# Patient Record
Sex: FEMALE | Race: WHITE
[De-identification: names, ages, dates, MRNs, and addresses within clinical notes are randomized per-mention and may not be internally consistent; named-entity substitution may affect disease eponyms.]

---

## 2019-07-03 ENCOUNTER — HOSPITAL ENCOUNTER (INPATIENT)
Dept: HOSPITAL 91 - E/R | Age: 29
LOS: 2 days | Discharge: HOME | DRG: 819 | End: 2019-07-05
Payer: COMMERCIAL

## 2019-07-03 ENCOUNTER — HOSPITAL ENCOUNTER (INPATIENT)
Dept: HOSPITAL 10 - E/R | Age: 29
LOS: 2 days | Discharge: HOME | DRG: 819 | End: 2019-07-05
Attending: OBSTETRICS & GYNECOLOGY | Admitting: OBSTETRICS & GYNECOLOGY
Payer: COMMERCIAL

## 2019-07-03 VITALS
BODY MASS INDEX: 28.24 KG/M2 | BODY MASS INDEX: 28.24 KG/M2 | HEIGHT: 67 IN | WEIGHT: 179.9 LBS | HEIGHT: 67 IN | WEIGHT: 179.9 LBS

## 2019-07-03 VITALS — DIASTOLIC BLOOD PRESSURE: 56 MMHG | RESPIRATION RATE: 17 BRPM | HEART RATE: 60 BPM | SYSTOLIC BLOOD PRESSURE: 105 MMHG

## 2019-07-03 DIAGNOSIS — N73.6: ICD-10-CM

## 2019-07-03 DIAGNOSIS — O00.101: Primary | ICD-10-CM

## 2019-07-03 LAB
ADD MAN DIFF?: NO
ANION GAP: 11 (ref 5–13)
BASOPHIL #: 0.1 10^3/UL (ref 0–0.1)
BASOPHILS %: 0.8 % (ref 0–2)
BLOOD UREA NITROGEN: 11 MG/DL (ref 7–20)
CALCIUM: 9.4 MG/DL (ref 8.4–10.2)
CARBON DIOXIDE: 23 MMOL/L (ref 21–31)
CHLORIDE: 107 MMOL/L (ref 97–110)
CREATININE: 0.66 MG/DL (ref 0.44–1)
EOSINOPHILS #: 0.6 10^3/UL (ref 0–0.5)
EOSINOPHILS %: 5.4 % (ref 0–7)
GLUCOSE: 86 MG/DL (ref 70–220)
HEMATOCRIT: 39.4 % (ref 37–47)
HEMOGLOBIN: 14.2 G/DL (ref 12–16)
IMMATURE GRANS #M: 0.05 10^3/UL (ref 0–0.03)
IMMATURE GRANS % (M): 0.4 % (ref 0–0.43)
LYMPHOCYTES #: 3.6 10^3/UL (ref 0.8–2.9)
LYMPHOCYTES %: 30.9 % (ref 15–51)
MEAN CORPUSCULAR HEMOGLOBIN: 30.1 PG (ref 29–33)
MEAN CORPUSCULAR HGB CONC: 36 G/DL (ref 32–37)
MEAN CORPUSCULAR VOLUME: 83.5 FL (ref 82–101)
MEAN PLATELET VOLUME: 10.6 FL (ref 7.4–10.4)
MONOCYTE #: 0.9 10^3/UL (ref 0.3–0.9)
MONOCYTES %: 7.6 % (ref 0–11)
NEUTROPHIL #: 6.5 10^3/UL (ref 1.6–7.5)
NEUTROPHILS %: 54.9 % (ref 39–77)
NUCLEATED RED BLOOD CELLS #: 0 10^3/UL (ref 0–0)
NUCLEATED RED BLOOD CELLS%: 0 /100WBC (ref 0–0)
PLATELET COUNT: 337 10^3/UL (ref 140–415)
POTASSIUM: 4 MMOL/L (ref 3.5–5.1)
RED BLOOD COUNT: 4.72 10^6/UL (ref 4.2–5.4)
RED CELL DISTRIBUTION WIDTH: 12.3 % (ref 11.5–14.5)
SODIUM: 141 MMOL/L (ref 135–144)
WHITE BLOOD COUNT: 11.8 10^3/UL (ref 4.8–10.8)

## 2019-07-03 PROCEDURE — 86901 BLOOD TYPING SEROLOGIC RH(D): CPT

## 2019-07-03 PROCEDURE — 76817 TRANSVAGINAL US OBSTETRIC: CPT

## 2019-07-03 PROCEDURE — 84703 CHORIONIC GONADOTROPIN ASSAY: CPT

## 2019-07-03 PROCEDURE — 36415 COLL VENOUS BLD VENIPUNCTURE: CPT

## 2019-07-03 PROCEDURE — 86900 BLOOD TYPING SEROLOGIC ABO: CPT

## 2019-07-03 PROCEDURE — 88305 TISSUE EXAM BY PATHOLOGIST: CPT

## 2019-07-03 PROCEDURE — 86850 RBC ANTIBODY SCREEN: CPT

## 2019-07-03 PROCEDURE — 85025 COMPLETE CBC W/AUTO DIFF WBC: CPT

## 2019-07-03 PROCEDURE — 99285 EMERGENCY DEPT VISIT HI MDM: CPT

## 2019-07-03 PROCEDURE — 80048 BASIC METABOLIC PNL TOTAL CA: CPT

## 2019-07-03 PROCEDURE — 84702 CHORIONIC GONADOTROPIN TEST: CPT

## 2019-07-03 PROCEDURE — 76801 OB US < 14 WKS SINGLE FETUS: CPT

## 2019-07-03 RX ADMIN — OXYTOCIN 1 MLS/HR: 10 INJECTION, SOLUTION INTRAMUSCULAR; INTRAVENOUS at 23:30

## 2019-07-03 NOTE — ERD
ER Documentation


Chief Complaint


Chief Complaint





pelvic pain dx ectopic pregnancy yesterday, 7 weeks 2 days





HPI


29-year-old female  Ab1 presenting for ectopic pregnancy that was diagnosed


at [facility].  She started having pelvic pain 2 days ago.  She 


went to the outside hospital where an ultrasound was done and she was diagnosed 


with ectopic.  However she did not like the surgeon there, so she signed out 


AMA.  She states her pain has improved.  She only has mild aching pain in her 


right pelvis, improved when not moving.  Worsened by walking.  No associated 


fever, chills, nausea, vomiting, discharge or vaginal bleeding.





ROS


All systems reviewed and are negative except as per history of present illness.





PMhx/Soc


Medical and Surgical Hx:  pt denies Medical Hx, pt denies Surgical Hx


Hx Alcohol Use:  No


Hx Substance Use:  No


Hx Tobacco Use:  Yes


Smoking Status:  Current every day smoker





FmHx


Family History:  No diabetes





Physical Exam


Vitals





Vital Signs


  Date      Temp  Pulse  Resp  B/P (MAP)   Pulse Ox  O2          O2 Flow    FiO2


Time                                                 Delivery    Rate


    7/3/19  98.4     69    18      117/73        96  Room Air


     16:20                           (88)


    7/3/19  98.4     90    18      117/73        96


     12:56                           (88)





Physical Exam


Const:   No acute distress


Head:   Atraumatic 


Eyes:    Normal Conjunctiva


ENT:    Normal External Ears, Nose and Mouth.


Neck:               Full range of motion. No meningismus.


Resp:   Clear to auscultation bilaterally


Cardio:   Regular rate and rhythm, no murmurs


Abd:    Soft, non tender, non distended.  No palpable masses.  Normal bowel jeanine


nds


Skin:   No petechiae or rashes


Back:   No midline or flank tenderness


Ext:    No cyanosis, or edema


Neur:   Awake and alert


Psych:    Normal Mood and Affect


Result Diagram:  


7/3/19 1634                                                                     


          7/3/19 1634





Results 24 hrs





Laboratory Tests


              Test
                                   7/3/19
16:34


              White Blood Count                      11.8 10^3/ul


              Red Blood Count                        4.72 10^6/ul


              Hemoglobin                                14.2 g/dl


              Hematocrit                                   39.4 %


              Mean Corpuscular Volume                     83.5 fl


              Mean Corpuscular Hemoglobin                 30.1 pg


              Mean Corpuscular Hemoglobin
Concent      36.0 g/dl 



              Red Cell Distribution Width                  12.3 %


              Platelet Count                          337 10^3/UL


              Mean Platelet Volume                        10.6 fl


              Immature Granulocytes %                     0.400 %


              Neutrophils %                                54.9 %


              Lymphocytes %                                30.9 %


              Monocytes %                                   7.6 %


              Eosinophils %                                 5.4 %


              Basophils %                                   0.8 %


              Nucleated Red Blood Cells %             0.0 /100WBC


              Immature Granulocytes #               0.050 10^3/ul


              Neutrophils #                           6.5 10^3/ul


              Lymphocytes #                           3.6 10^3/ul


              Monocytes #                             0.9 10^3/ul


              Eosinophils #                           0.6 10^3/ul


              Basophils #                             0.1 10^3/ul


              Nucleated Red Blood Cells #             0.0 10^3/ul


              Sodium Level                             141 mmol/L


              Potassium Level                          4.0 mmol/L


              Chloride Level                           107 mmol/L


              Carbon Dioxide Level                      23 mmol/L


              Anion Gap                                        11


              Blood Urea Nitrogen                        11 mg/dl


              Creatinine                               0.66 mg/dl


              Est Glomerular Filtrat Rate
mL/min   > 60 mL/min 



              Glucose Level                              86 mg/dl


              Calcium Level                             9.4 mg/dl


              Beta HCG, Quantitative               19478.0 mIU/ml





Current Medications


 Medications
   Dose
          Sig/Axel
       Start Time
   Status  Last


 (Trade)       Ordered        Route
 PRN     Stop Time              Admin
Dose


                              Reason                                Admin


 Ondansetron    4 mg           BRIDGE ORDER   7/3/19                 



HCl
  (Zofran                 PRN
 IV
       18:00
 19


Inj)                          NAUSEA/VOMITI  17:59


                              NG


                650 mg         ER BRIDGE      7/3/19                 



Acetaminophen                 PRN
 PO
       18:00
 19



  (Tylenol                   .MILD PAIN     17:59


Tab)                          1-3 OR TEMP








Procedures/MDM


EMERGENT LABS AND DIAGNOSTIC STUDIES: 


Lab Results above were reviewed and interpreted by me. 





CBC: no anemia or evidence of infection


BMP: No e/o clinically significant electrolyte abnormality severe acidosis, 


alkalosis, renal failure, diabetic ketoacidosis


Beta-hCG pending


___________________________________________________________ 


Radiology Results as interpreted by Radiology below were reviewed by GEMA Aguila MD: 





Pelvic ultrasound shows a viable 8-week ectopic pregnancy in the right adnexa 


with no free fluid


___________________________________________________________ 


Initial Nursing notes reviewed. 


Previous Medical Records requested via the Electronic Health Record. 





EMERGENCY DEPARTMENT COURSE / MEDICAL DECISION MAKING: 





Patient is presenting with of right-sided ectopic pregnancy, hemodynamically 


stable with no evidence of rupture.  Patient will require admission for surgery.


 Spoke with the OB/GYN on-call who has accepted her for admission.





Accepting Care Team:


Current data and ongoing care discussed.


Time:                      Time of admission


Primary Provider:      Dr. Reynoso


Outstanding Data:       none





Departure


Diagnosis:  


   Primary Impression:  


   Ectopic pregnancy


   Location of ectopic pregnancy:  tubal  Intrauterine pregnancy status:  


   without intrauterine pregnancy  Laterality:  right  Qualified Codes:  O00.101


   - Right tubal pregnancy without intrauterine pregnancy


Condition:  Serious











GUERDA AGUILA MD          Jul 3, 2019 17:31

## 2019-07-04 VITALS — RESPIRATION RATE: 22 BRPM | SYSTOLIC BLOOD PRESSURE: 112 MMHG | DIASTOLIC BLOOD PRESSURE: 59 MMHG | HEART RATE: 78 BPM

## 2019-07-04 VITALS — SYSTOLIC BLOOD PRESSURE: 111 MMHG | DIASTOLIC BLOOD PRESSURE: 69 MMHG | HEART RATE: 74 BPM | RESPIRATION RATE: 24 BRPM

## 2019-07-04 VITALS — RESPIRATION RATE: 18 BRPM | HEART RATE: 72 BPM | SYSTOLIC BLOOD PRESSURE: 106 MMHG | DIASTOLIC BLOOD PRESSURE: 54 MMHG

## 2019-07-04 VITALS — HEART RATE: 78 BPM | DIASTOLIC BLOOD PRESSURE: 55 MMHG | RESPIRATION RATE: 18 BRPM | SYSTOLIC BLOOD PRESSURE: 103 MMHG

## 2019-07-04 VITALS — HEART RATE: 74 BPM | RESPIRATION RATE: 20 BRPM | DIASTOLIC BLOOD PRESSURE: 49 MMHG | SYSTOLIC BLOOD PRESSURE: 95 MMHG

## 2019-07-04 VITALS — DIASTOLIC BLOOD PRESSURE: 52 MMHG | HEART RATE: 91 BPM | RESPIRATION RATE: 19 BRPM | SYSTOLIC BLOOD PRESSURE: 98 MMHG

## 2019-07-04 VITALS — DIASTOLIC BLOOD PRESSURE: 66 MMHG | HEART RATE: 78 BPM | RESPIRATION RATE: 25 BRPM | SYSTOLIC BLOOD PRESSURE: 113 MMHG

## 2019-07-04 VITALS — HEART RATE: 82 BPM | RESPIRATION RATE: 20 BRPM | SYSTOLIC BLOOD PRESSURE: 104 MMHG | DIASTOLIC BLOOD PRESSURE: 73 MMHG

## 2019-07-04 VITALS — HEART RATE: 86 BPM | DIASTOLIC BLOOD PRESSURE: 66 MMHG | RESPIRATION RATE: 23 BRPM | SYSTOLIC BLOOD PRESSURE: 109 MMHG

## 2019-07-04 VITALS — RESPIRATION RATE: 22 BRPM | HEART RATE: 76 BPM | SYSTOLIC BLOOD PRESSURE: 111 MMHG | DIASTOLIC BLOOD PRESSURE: 65 MMHG

## 2019-07-04 VITALS — RESPIRATION RATE: 20 BRPM | HEART RATE: 95 BPM | SYSTOLIC BLOOD PRESSURE: 109 MMHG | DIASTOLIC BLOOD PRESSURE: 66 MMHG

## 2019-07-04 VITALS — DIASTOLIC BLOOD PRESSURE: 61 MMHG | RESPIRATION RATE: 22 BRPM | SYSTOLIC BLOOD PRESSURE: 108 MMHG | HEART RATE: 74 BPM

## 2019-07-04 VITALS — SYSTOLIC BLOOD PRESSURE: 105 MMHG | DIASTOLIC BLOOD PRESSURE: 68 MMHG | HEART RATE: 74 BPM | RESPIRATION RATE: 28 BRPM

## 2019-07-04 VITALS — HEART RATE: 78 BPM | RESPIRATION RATE: 25 BRPM | SYSTOLIC BLOOD PRESSURE: 112 MMHG | DIASTOLIC BLOOD PRESSURE: 65 MMHG

## 2019-07-04 VITALS — RESPIRATION RATE: 26 BRPM | SYSTOLIC BLOOD PRESSURE: 115 MMHG | DIASTOLIC BLOOD PRESSURE: 76 MMHG | HEART RATE: 72 BPM

## 2019-07-04 VITALS — SYSTOLIC BLOOD PRESSURE: 109 MMHG | DIASTOLIC BLOOD PRESSURE: 56 MMHG | HEART RATE: 82 BPM | RESPIRATION RATE: 20 BRPM

## 2019-07-04 VITALS — HEART RATE: 72 BPM | DIASTOLIC BLOOD PRESSURE: 68 MMHG | RESPIRATION RATE: 22 BRPM | SYSTOLIC BLOOD PRESSURE: 104 MMHG

## 2019-07-04 LAB
ADD MAN DIFF?: NO
BASOPHIL #: 0.1 10^3/UL (ref 0–0.1)
BASOPHILS %: 0.7 % (ref 0–2)
EOSINOPHILS #: 0.5 10^3/UL (ref 0–0.5)
EOSINOPHILS %: 5.3 % (ref 0–7)
HEMATOCRIT: 36.2 % (ref 37–47)
HEMOGLOBIN: 13 G/DL (ref 12–16)
IMMATURE GRANS #M: 0.06 10^3/UL (ref 0–0.03)
IMMATURE GRANS % (M): 0.6 % (ref 0–0.43)
LYMPHOCYTES #: 3.5 10^3/UL (ref 0.8–2.9)
LYMPHOCYTES %: 35.7 % (ref 15–51)
MEAN CORPUSCULAR HEMOGLOBIN: 30 PG (ref 29–33)
MEAN CORPUSCULAR HGB CONC: 35.9 G/DL (ref 32–37)
MEAN CORPUSCULAR VOLUME: 83.6 FL (ref 82–101)
MEAN PLATELET VOLUME: 10.6 FL (ref 7.4–10.4)
MONOCYTE #: 0.9 10^3/UL (ref 0.3–0.9)
MONOCYTES %: 8.9 % (ref 0–11)
NEUTROPHIL #: 4.7 10^3/UL (ref 1.6–7.5)
NEUTROPHILS %: 48.8 % (ref 39–77)
NUCLEATED RED BLOOD CELLS #: 0 10^3/UL (ref 0–0)
NUCLEATED RED BLOOD CELLS%: 0 /100WBC (ref 0–0)
PLATELET COUNT: 309 10^3/UL (ref 140–415)
RED BLOOD COUNT: 4.33 10^6/UL (ref 4.2–5.4)
RED CELL DISTRIBUTION WIDTH: 12.3 % (ref 11.5–14.5)
WHITE BLOOD COUNT: 9.7 10^3/UL (ref 4.8–10.8)

## 2019-07-04 PROCEDURE — 10T24ZZ RESECTION OF PRODUCTS OF CONCEPTION, ECTOPIC, PERCUTANEOUS ENDOSCOPIC APPROACH: ICD-10-PCS | Performed by: OBSTETRICS & GYNECOLOGY

## 2019-07-04 PROCEDURE — 0UN54ZZ RELEASE RIGHT FALLOPIAN TUBE, PERCUTANEOUS ENDOSCOPIC APPROACH: ICD-10-PCS | Performed by: OBSTETRICS & GYNECOLOGY

## 2019-07-04 PROCEDURE — 0UB54ZZ EXCISION OF RIGHT FALLOPIAN TUBE, PERCUTANEOUS ENDOSCOPIC APPROACH: ICD-10-PCS | Performed by: OBSTETRICS & GYNECOLOGY

## 2019-07-04 PROCEDURE — 0UB54ZZ EXCISION OF RIGHT FALLOPIAN TUBE, PERCUTANEOUS ENDOSCOPIC APPROACH: ICD-10-PCS

## 2019-07-04 PROCEDURE — 10T24ZZ RESECTION OF PRODUCTS OF CONCEPTION, ECTOPIC, PERCUTANEOUS ENDOSCOPIC APPROACH: ICD-10-PCS

## 2019-07-04 PROCEDURE — 0UN04ZZ RELEASE RIGHT OVARY, PERCUTANEOUS ENDOSCOPIC APPROACH: ICD-10-PCS | Performed by: OBSTETRICS & GYNECOLOGY

## 2019-07-04 PROCEDURE — 0UN04ZZ RELEASE RIGHT OVARY, PERCUTANEOUS ENDOSCOPIC APPROACH: ICD-10-PCS

## 2019-07-04 PROCEDURE — 0UN54ZZ RELEASE RIGHT FALLOPIAN TUBE, PERCUTANEOUS ENDOSCOPIC APPROACH: ICD-10-PCS

## 2019-07-04 RX ADMIN — PYRIDOXINE HYDROCHLORIDE 1 MLS/HR: 100 INJECTION, SOLUTION INTRAMUSCULAR; INTRAVENOUS at 20:55

## 2019-07-04 RX ADMIN — PYRIDOXINE HYDROCHLORIDE SCH MLS/HR: 100 INJECTION, SOLUTION INTRAMUSCULAR; INTRAVENOUS at 11:05

## 2019-07-04 RX ADMIN — PYRIDOXINE HYDROCHLORIDE SCH MLS/HR: 100 INJECTION, SOLUTION INTRAMUSCULAR; INTRAVENOUS at 04:23

## 2019-07-04 RX ADMIN — FENTANYL CITRATE 1 MCG: 50 INJECTION, SOLUTION INTRAMUSCULAR; INTRAVENOUS at 19:40

## 2019-07-04 RX ADMIN — PYRIDOXINE HYDROCHLORIDE SCH MLS/HR: 100 INJECTION, SOLUTION INTRAMUSCULAR; INTRAVENOUS at 20:55

## 2019-07-04 RX ADMIN — PYRIDOXINE HYDROCHLORIDE 1 MLS/HR: 100 INJECTION, SOLUTION INTRAMUSCULAR; INTRAVENOUS at 19:37

## 2019-07-04 RX ADMIN — PYRIDOXINE HYDROCHLORIDE 1 MLS/HR: 100 INJECTION, SOLUTION INTRAMUSCULAR; INTRAVENOUS at 11:05

## 2019-07-04 RX ADMIN — CEFAZOLIN SODIUM 1 MLS/HR: 2 SOLUTION INTRAVENOUS at 03:30

## 2019-07-04 RX ADMIN — PYRIDOXINE HYDROCHLORIDE SCH MLS/HR: 100 INJECTION, SOLUTION INTRAMUSCULAR; INTRAVENOUS at 19:37

## 2019-07-04 RX ADMIN — PYRIDOXINE HYDROCHLORIDE 1 MLS/HR: 100 INJECTION, SOLUTION INTRAMUSCULAR; INTRAVENOUS at 04:23

## 2019-07-04 RX ADMIN — PYRIDOXINE HYDROCHLORIDE 1 MLS/HR: 100 INJECTION, SOLUTION INTRAMUSCULAR; INTRAVENOUS at 13:02

## 2019-07-04 RX ADMIN — BUPIVACAINE HYDROCHLORIDE AND EPINEPHRINE BITARTRATE 1 ML: 2.5; .005 INJECTION, SOLUTION EPIDURAL; INFILTRATION; INTRACAUDAL; PERINEURAL at 17:40

## 2019-07-04 RX ADMIN — PYRIDOXINE HYDROCHLORIDE SCH MLS/HR: 100 INJECTION, SOLUTION INTRAMUSCULAR; INTRAVENOUS at 13:02

## 2019-07-04 NOTE — HP
Date/Time of Note


Date/Time of Note


DATE: 7/3/19 


TIME: 23:17





Assessment/Plan


VTE Prophylaxis


Risk score (from Ns)>0 risk:  2


SCD applied (from Ns):  No


SCD contraindicated:  low risk/ambulating


Pharmacological prophylaxis:  NA/contraindicated


Pharm contraindication:  low risk/ambulating





Lines/Catheters


IV Catheter Type (from [facility]):  Saline Lock


Central line still needed:  No


Urinary Cath still in place:  No





Assessment/Plan


Hospital Course


Right lower abdominal pain, ectopic pregnancy and right side.  Size of ectopic 


gestation more than 3.5 cm with presence of fetal echo.


Patient currently hemodynamically stable.


Discussed regarding management.  Due to the size of ectopic gestation more than 


3.5 cm and fetal echo recommended surgical management.  


Patient is not a good candidate for medical management.  She does not have any 


gynecologist as outpatient to have a follow-up, does not appear to be reliable 


to have a follow-up with serial hCG.  However I discussed about medical 


management option as well however due to size of ectopic gestation not a good 


candidate for medical management.


Procedure including laparoscopic right salpingostomy possible salpingectomy 


discussed with the patient.  Risk and benefit of procedure including risk of 


infection, bleeding, risk of anesthesia, risk of blood transfusion including but


not limited to blood borne infection including HIV, hepatitis B and C and 


transfusion reaction discussed with patient in detail


Patient verbalized understanding.


She desires to proceed with surgical management versus methotrexate treatment.


All questions were answered to patient's best satisfaction consent was signed.


Patient asked of blood transfusion in case of emergency.


She was booked for diagnostic laparoscopy, laparoscopic salpingostomy possible 


salpingectomy possible blood transfusion possible open or any other indicated 


procedure


Problems:  


(1) Ectopic pregnancy


Status:  Acute


Qualifiers:  


   Location of ectopic pregnancy:  tubal  Intrauterine pregnancy status:  


without intrauterine pregnancy  Laterality:  right  Qualified Codes:  O00.101 - 


Right tubal pregnancy without intrauterine pregnancy


Result Diagram:  


7/3/19 1634                                                                     


          7/3/19 1634





Results 24hrs





Laboratory Tests


               Test
                                7/3/19
16:34


               White Blood Count                         11.8  H


               Red Blood Count                            4.72


               Hemoglobin                                 14.2


               Hematocrit                                 39.4


               Mean Corpuscular Volume                    83.5


               Mean Corpuscular Hemoglobin                30.1


               Mean Corpuscular Hemoglobin
Concent       36.0  



               Red Cell Distribution Width                12.3


               Platelet Count                              337


               Mean Platelet Volume                      10.6  H


               Immature Granulocytes %                   0.400


               Neutrophils %                              54.9


               Lymphocytes %                              30.9


               Monocytes %                                 7.6


               Eosinophils %                               5.4


               Basophils %                                 0.8


               Nucleated Red Blood Cells %                 0.0


               Immature Granulocytes #                  0.050  H


               Neutrophils #                               6.5


               Lymphocytes #                              3.6  H


               Monocytes #                                 0.9


               Eosinophils #                              0.6  H


               Basophils #                                 0.1


               Nucleated Red Blood Cells #                 0.0


               Sodium Level                                141


               Potassium Level                             4.0


               Chloride Level                              107


               Carbon Dioxide Level                         23


               Anion Gap                                    11


               Blood Urea Nitrogen                          11


               Creatinine                                 0.66


               Est Glomerular Filtrat Rate
mL/min   > 60  



               Glucose Level                                86


               Calcium Level                               9.4


               Beta HCG, Quantitative                  01487.0








HPI/ROS


Admit Date/Time


Admit Date/Time


Jul 3, 2019 at 17:36





Hx of Present Illness


July 3, 2019





29-year-old G3, P1 with amenorrhea for 8 weeks and 4 days and known diagnosis of


ectopic pregnancy, presented to emergency room at [facility] 


for management of ectopic pregnancy.  Patient was seen in the emergency room in 


[facility] yesterday due to right-sided lower abdominal pain and 


early pregnancy and was noted to have ectopic pregnancy at about 8 weeks.  She 


was in the process of having the surgery however she did not like the surgeon 


and desired to sign AMA and presented to [facility] for 


management.  Patient denies having any vaginal bleeding, shortness of breath, 


chest pain, dizziness, lightheadedness or any other complaint except moderate 


pain in the lower abdomen.


She had a pelvic ultrasound here that showed evidence of 8 weeks pregnancy with 


fetal echo and yolk sac in the right adnexa.  She was admitted with diagnosis of


ectopic pregnancy.





ROS


Constitutional:  no complaints; 


   No improved, No chills, No diaphoresis, No disoriented, No fatigue, No 


febrile, No nausea, No poor po, No weight change, No other


Eyes:  No no complaints, No pain, No discharge, No redness, No visual change, No


other


ENT:  No no complaints, No bleeding, No pain, No congestion, No discharge, No 


dysphagia, No sore throat, No other


Respiratory:  No no complaints, No pain, No cough, No pleuritic pain, No 


shortness of breath, No sputum, No wheezing, No other


Cardiovascular:  No no complaints, No chest pain, No edema, No lightheadedness, 


No orthopenea, No palpitations, No paroxysmal nocturnal dyspnea, No other


Gastrointestinal:  pain, other (Lower abdominal pain in the right side with 


radiation to the thigh.); 


   No no complaints, No blood, No constipation, No decreased appetite, No 


diarrhea, No flatus, No nausea, No passing stool, No vomiting


Genitourinary:  No no complaints, No bleeding, No dysuria, No discharge, No 


flank pain, No hematuria, No other


Musculoskeletal:  No no complaints, No back pain, No bone/joint pain, No neck 


pain, No restricted range of motion, No swelling, No other


Skin:  No no complaints, No bruising, No erythema, No laceration, No pruritis, 


No rash, No skin lesions, No other


Neurologic:  No no complaints, No confusion, No dizziness, No focal-weakness, No


headache, No syncope, No seizure, No other


Endocrine:  No no complaints, No polyuria, No polydypsia, No dry skin, No temp i


ntolerance, No weight change, No other


Lymphatic:  No no complaints, No adenopathy, No tender nodes, No lymphadema, No 


other





PMH/Family/Social


Past Medical History


Denies any medical problems in the past


Medications





Current Medications


Ondansetron HCl (Zofran Inj) 4 mg BRIDGE ORDER PRN IV NAUSEA/VOMITING;  Start 


7/3/19 at 18:00;  Stop 7/4/19 at 17:59


Acetaminophen (Tylenol Tab) 650 mg ER BRIDGE PRN PO .MILD PAIN 1-3 OR TEMP;  


Start 7/3/19 at 18:00;  Stop 7/4/19 at 17:59


Dextrose/Lactated Ringer's 1,000 ml @  125 mls/hr Q8H IV ;  Start 7/3/19 at 


23:30;  Status UNV


Coded Allergies:  


     erythromycin base (Verified  Allergy, Unknown, 7/3/19)





Past Surgical History


Past Surgical Hx:  no surgical history





Family History


Significant Family History:  no pertinent family hx





Social History


Alcohol Use:  none


Smoking Status:  Unknown if ever smoked


Drug Use:  none





Exam/Review of Systems


Vital Signs


Vitals





Vital Signs


  Date      Temp  Pulse  Resp  B/P (MAP)   Pulse Ox  O2          O2 Flow    FiO2


Time                                                 Delivery    Rate


    7/3/19  98.1     60    17      105/56        99


     20:00                           (72)


    7/3/19                                           Room Air


     16:20








Exam


Constitutional:  alert, oriented, well developed


Psych:  no complaints, nl mood/affect, anxiety


Head:  normocephalic, atraumatic


Eyes:  nl conjunctiva, EOMI, nl lids


ENMT:  nl external ears & nose, nl lips & teeth, nl nasal mucosa & septum


Neck:  supple, non-tender


Respiratory:  clear to auscultation, normal air movement


Cardiovascular:  regular rate and rhythm, nl pulses


Gastrointestinal:  soft, nl liver, spleen, tender, other (Tenderness in the 


right lower abdomen.  No rebound tenderness, no guarding, no rigidity no 


evidence of acute abdomen.  Abdomen is soft.)


Genitourinary - Female:  other (Sterile speculum examination: Cervix appears 


normal.  Closed.  No abnormal vaginal discharge.  No blood in the vault.  


Bimanual examination: Uterus normal size and nontender.  There is some moderate 


tenderness in the right adnexa.  No tenderness in left adnexa.  No fullness in 


the left adnexa.)


Musculoskeletal:  nl extremities to inspection, nl gait and stance


Extremities:  normal pulses


Neurological:  CNS II-XII intact, nl mental status


Skin:  nl turgor


Additional Comments


PROCEDURE:   US OB. 


 


CLINICAL INDICATION:   Vaginal bleeding. 


 


TECHNIQUE:   Transabdominal and transvaginal sonographic evaluation of the 


uterus was performed.


 


COMPARISON:   None.  


 


FINDINGS:


 


Uterus is anteverted and measures 10.5 x 5.8 x 7.4 cm. No intrauterine gestation


al sac is seen.


 


Right ovary measures 3.5 x 2.7 x 2.4 cm. There is a 4.9 x 3.6 x 4.2 cm echogenic


mass with central cystic component containing fetal pole and yolk sac abutting 


the right ovary. Crown-rump length measures 15.8 millimeter corresponding to 


gestational age of 8 weeks. Fetal heart rate of 171 beats per minute was 


detected during this examination.


 


Left ovary measures 4.3 x 2.3 x 2.3 cm. There is no free pelvic fluid.


 


IMPRESSION:


 


1.  Findings consistent with right adnexal ectopic gestation containing yolk sac


and living fetal pole.


2.  No visible intrauterine pregnancy.


3.  No free pelvic fluid.


 


 


 


Findings were discussed with Dr. Aguila by Dr. Humphrey at 05:55 p.m. on 


07/03/2019.


 


RPTAT:HAJM


_____________________________________________











BALJIT CALLAHAN MD               Jul 4, 2019 03:05

## 2019-07-04 NOTE — PAC
Date/Time of Note


Date/Time of Note


DATE: 7/4/19 


TIME: 18:54





Post-Anesthesia Notes


Post-Anesthesia Note


Last documented vital signs





Vital Signs


  Date      Temp  Pulse  Resp  B/P (MAP)   Pulse Ox  O2          O2 Flow    FiO2


Time                                                 Delivery    Rate


    7/4/19  98.0     72    18      106/54        95  Room Air


      1854                           (71)





Activity:  WNL


Respiratory function:  WNL


Cardiovascular function:  WNL


Mental status:  Baseline


Pain reasonably controlled:  Yes


Hydration appropriate:  Yes


Nausea/Vomiting absent:  Yes











MARNI GOMEZ                  Jul 4, 2019 18:55

## 2019-07-04 NOTE — PREAC
Date/Time of Note


Date/Time of Note


DATE: 7/4/19 


TIME: 16:38





Anesthesia Eval and Record


Evaluation


Time Pre-Procedure Interview


DATE: 7/4/19 


TIME: 16:38


Age


29


Sex


female


NPO:  8 hrs


Preoperative diagnosis


ectopic pregnancy


Planned procedure


laparoscopic ectopic removal, salpingectomy





Past Medical History


Past Medical History:  None





Surgery & Anesthesia Issues


No known issue





Meds


Anticoagulation:  No


Beta Blocker within 24 hr:  No


Reason Beta Blocker not given:  Pt. not on B-Blocker





Current Medications


Ondansetron HCl (Zofran Inj) 4 mg BRIDGE ORDER PRN IV NAUSEA/VOMITING;  Start 


7/3/19 at 18:00;  Stop 7/4/19 at 17:59


Acetaminophen (Tylenol Tab) 650 mg ER BRIDGE PRN PO .MILD PAIN 1-3 OR TEMP;  


Start 7/3/19 at 18:00;  Stop 7/4/19 at 17:59


Lactated Ringer's 1,000 ml @  125 mls/hr Q8H IV  Last administered on 7/4/19at 


13:02; Admin Dose 125 MLS/HR;  Start 7/4/19 at 03:05


Meds reviewed:  Yes





Allergies


Coded Allergies:  


     erythromycin base (Verified  Allergy, Unknown, 7/3/19)


Allergies Reviewed:  Yes





Labs/Studies


Labs Reviewed:  Reviewed by anesthesiologist


Result Diagram:  


7/4/19 0353                                                                     


          7/3/19 1634





Laboratory Tests


7/4/19 03:53











Blood Bank


                         Test
            7/4/19
03:53


                         Antibody Screen  NEGATIVE


                         Blood Type       B POSITIVE





Pregnancy test:  Positive





Pre-procedure Exam


Last vitals





Vital Signs


  Date      Temp  Pulse  Resp  B/P (MAP)   Pulse Ox  O2          O2 Flow    FiO2


Time                                                 Delivery    Rate


    7/4/19  98.0     72    18      106/54        95  Room Air


     14:51                           (71)





Airway:  Adequate mouth opening, Adequate thyromental dist


Mallampati:  Mallampati II


Teeth:  Normal


Lung:  Normal


Heart:  Normal





ASA Physical Status


ASA physical status:  2


Emergency:  E





Planned Anesthetic


General/MAC:  ETT





Planned Pain Management


Parenteral pain med





Pre-operative Attestations


Prior to commencing anesthesia and surgery, the patient was re-evaluated, there 


was verification of:


*The patient's identity


*The results of appropriate recent lab work and preoperative vital signs


*The above evaluation not changing prior to induction


*Anesthetic plan, risk benefits, alternative and complications discussed with 


patient/family; questions answered; patient/family understands, accepts and 


wishes to proceed.











MARNI GOMEZ                  Jul 4, 2019 16:39

## 2019-07-04 NOTE — OPR
Date/Time of Note


Date/Time of Note


DATE: 7/4/19 


TIME: 18:48





Operative Report


Free Text/Dictation


July 4, 2019


Procedure Date:  Jul 4, 2019


Preoperative Diagnosis


1.  Right tubal pregnancy


Postoperative Diagnosis


Right pre-ruptured tubal pregnancy


Peritubal extensive adhesions as well as periovarian and adhesion in the 


posterior cul-de-sac and perihepatic adhesion consistent with likely prior 


episodes of PID


Evidence of Adair epstein seen


Operation/Procedure Performed


1.  Laparoscopic right salpingectomy


2. Lysis of adhesion


Surgeon


see signature line


Assistant


Surgical scrub


Anesthesia Type:  general


Anesthesiologist:  MARNI GOMEZ


Estimated Blood Loss:  50 - 100 ml's


Transfusion


   none


Specimen


Right tube


Grafts/Implants


none


Tubes/Drains


N/A


Complications


none


Pt Condition Post Procedure:  stable


Disposition:  PACU


Indications


Right 4.5 cm ectopic pregnancy


Procedure Description


29-year-old female with the right lower abdominal pain and new episode of 


pregnancy, 8 weeks of amenorrhea and right tubal pregnancy at 8 weeks gestation 


based on CRL seen in the right tube, candidate for laparoscopic salpingectomy 


possible salpingectomy after discussion about the options and risk and benefit 


in detail.


Due to size of the right ectopic gestation recommended surgical management.  


Patient desires to proceed with surgery.  Risk and benefit of surgery including 


risk of infection, bleeding, damage to surrounding structures including bowel 


and bladder and risk of conversion to open procedure and risk of blood 


transfusion including but not limited to blood borne infection including HIV, 


hepatitis B and C and transfusion reaction discussed with patient in detail and 


informed consent was obtained.


Patient verbalized understanding all above risks.  She desires to proceed.  She 


received 2 g Ancef on the way to the OR and after adequate general endotracheal 


anesthesia she was prepped and draped in the dorsolithotomy position.  Timeout 


procedure completed and patient was identified correctly.  After prepped and 


draped in the dorsal lithotomy position first a bivalve speculum placed inside 


the vagina.  Anterior lip of the cervix grasped using tenaculum.  Uterine depth 


was measured using uterine sound was noted to be 8 cm.  Internal cervical os was


easily accommodate Hegar dilator #4.  Then a HUMI manipulator was passed through


the cervix into the uterine cavity and its balloon was inflated and transfixed. 


The tenaculum was removed from anterior lip of the cervix.  Gloves was changed 


and then attention was turned to the abdomen.  After injection about 8 cc 


quarter percent Marcaine with epi in the umbilicus area a 5 mm incision was made


inside the umbilicus.  Using a 5 mm trocar Optiview the past through the umbi


lical incision under direct visualization, intra-abdominal cavity entered 


without any complication.  Insufflation started using CO2 gas.  After adequate 


insufflation about 3 and half liters of CO2 gas the pelvis evaluated.  There was


about 10 cc of blood in the pelvis.  Ectopic tubal pregnancy in the right side 


noted to be size of about 4 and half centimeter that appeared to be pre-


ruptured.  There was some oozing from the pre-ruptured tube in the right side.  


There was also evidence of extensive adhesion around the both tubes and ovaries 


noted as well as in the posterior cul-de-sac.  Then upper abdomen evaluated.  


Liver identified.  There is evidence of perihepatic adhesions as well.


there was also extensive filmy peritubal adhesion in the left side as well.  The


tube in the left side did not also appear to be intact and normal looking due to


extensive peritubal and periovarian adhesions.  Intraoperative findings was 


consistent with likely prior multiple episodes of PID as there was perihepatic 


adhesion consistent with Abigail Mayorga noted.


At this point after negative transillumination test a 5 mm incision was made in 


the left lower quadrant and then under direct visualization a 5 Mccann trocar 


passed through the left lower quadrant into the abdominal cavity and procedure 


proceeded in a similar fashion the right side and after negative transmission 


test a 5 mm trocar as well as placed in the right lower quadrant under direct 


visualization at 5 fingerbreadth above the anterior superior iliac spine.


Harmonic at this point procedure proceeded by performing salpingectomy in the 


right side.  Using scalpel the tube in the right side that had ectopic gestation


the right side was excised and the resected tube that also incorporated 


accompanied ectopic gestation was placed in the anterior cul-de-sac.


 there was also some adhesion bands around the ovary and tube in the right side 


that was also excised using harmonic scalpel.  Then hemostasis of the resected 


side and the small remainder of the excised tube in the right side was obtained 


using harmonic scalpel.


Then the resected tube with associated tubal pregnancy was placed in the 


anterior cul-de-sac.  Irrigation of the abdomen and pelvis done using warm 


normal saline.  Reevaluation of the resected site of the stump of the right tube


with a negative pressure check confirmed good hemostasis.  FloSeal was then 


applied over the resected site.  Then the upper abdomen and the rest of the 


abdomen and pelvis evaluated.  No other pathology noted.  


Then the left lower 5 mm trocar  removed and the skin incision was extended to. 


12 mm trocar.  this trocar was placed under direct visualization without any 


complication.  Then the Endobag was introduced through this trocar site.  The 


resected right tube that had associated ectopic gestation was placed inside the 


Endobag.  It was then removed through the 12 mm trocar site and then left lower 


abdomen.  This specimen was sent to pathology.  Then the fascia in 12 mm trocar 


site in the left upper abdomen was repaired using two 1-0 Vicryl using Endo 


suture.  Then again after reassurance about the hemostasis of operative site 


then the 2 other trocar site in the umbilicus and right lower abdomen removed 


after adequate evacuation of CO2 gas while the patient was in the Trendelenburg 


position and anesthesia was giving 5 big breath.  Then the trocar site in the 


umbilicus and right and left lower quadrant was repaired using 3-0 Monocryl and 


at the end Dermabond was applied in all 3 trocar site.  Then attention was 


turned to the vagina where the HUMI manipulator was removed after deflation of 


its balloon.  Then the tenaculum site over the anterior lip of the cervix 


evaluated there was no bleeding and hemostasis was complete.  Then all 


instruments removed from the vagina.  Patient tolerated the procedure well.  


Sponge lap and instrument count and needle count were correct x2 patient was 


then transferred to recovery room in stable condition of note that Wilson 


catheter was draining clear yellow urine.  EBL 50 cc.











BALJIT CALLAHAN MD               Jul 4, 2019 18:50

## 2019-07-05 VITALS — SYSTOLIC BLOOD PRESSURE: 108 MMHG | HEART RATE: 94 BPM | DIASTOLIC BLOOD PRESSURE: 55 MMHG | RESPIRATION RATE: 15 BRPM

## 2019-07-05 VITALS — SYSTOLIC BLOOD PRESSURE: 109 MMHG | HEART RATE: 85 BPM | RESPIRATION RATE: 18 BRPM | DIASTOLIC BLOOD PRESSURE: 71 MMHG

## 2019-07-05 VITALS — RESPIRATION RATE: 18 BRPM | DIASTOLIC BLOOD PRESSURE: 57 MMHG | HEART RATE: 80 BPM | SYSTOLIC BLOOD PRESSURE: 103 MMHG

## 2019-07-05 RX ADMIN — HYDROCODONE BITARTRATE AND ACETAMINOPHEN 1 TAB: 5; 325 TABLET ORAL at 07:08

## 2019-07-05 RX ADMIN — HYDROCODONE BITARTRATE AND ACETAMINOPHEN PRN TAB: 5; 325 TABLET ORAL at 07:08

## 2019-07-05 RX ADMIN — HYDROCODONE BITARTRATE AND ACETAMINOPHEN 1 TAB: 5; 325 TABLET ORAL at 02:29

## 2019-07-05 RX ADMIN — IBUPROFEN 1 MG: 600 TABLET ORAL at 16:33

## 2019-07-05 RX ADMIN — PYRIDOXINE HYDROCHLORIDE 1 MLS/HR: 100 INJECTION, SOLUTION INTRAMUSCULAR; INTRAVENOUS at 07:06

## 2019-07-05 RX ADMIN — HYDROCODONE BITARTRATE AND ACETAMINOPHEN 1 TAB: 5; 325 TABLET ORAL at 11:34

## 2019-07-05 RX ADMIN — PYRIDOXINE HYDROCHLORIDE SCH MLS/HR: 100 INJECTION, SOLUTION INTRAMUSCULAR; INTRAVENOUS at 07:06

## 2019-07-05 RX ADMIN — PYRIDOXINE HYDROCHLORIDE 1 MLS/HR: 100 INJECTION, SOLUTION INTRAMUSCULAR; INTRAVENOUS at 15:42

## 2019-07-05 RX ADMIN — IBUPROFEN PRN MG: 600 TABLET ORAL at 08:45

## 2019-07-05 RX ADMIN — PYRIDOXINE HYDROCHLORIDE SCH MLS/HR: 100 INJECTION, SOLUTION INTRAMUSCULAR; INTRAVENOUS at 15:42

## 2019-07-05 RX ADMIN — HYDROCODONE BITARTRATE AND ACETAMINOPHEN PRN TAB: 5; 325 TABLET ORAL at 02:29

## 2019-07-05 RX ADMIN — IBUPROFEN PRN MG: 600 TABLET ORAL at 16:33

## 2019-07-05 RX ADMIN — IBUPROFEN 1 MG: 600 TABLET ORAL at 08:45

## 2019-07-05 RX ADMIN — HYDROCODONE BITARTRATE AND ACETAMINOPHEN PRN TAB: 5; 325 TABLET ORAL at 11:34

## 2019-07-05 NOTE — PN
Date/Time of Note


Date/Time of Note


DATE: 7/5/19 


TIME: 17:16





Assessment/Plan


VTE Prophylaxis


Risk score (from Nsg)>0 risk:  1


SCD applied (from Nsg):  Yes


SCD contraindicated:  low risk/ambulating


Pharmacological prophylaxis:  NA/contraindicated


Pharm contraindication:  low risk/ambulating





Lines/Catheters


IV Catheter Type (from Nrs):  Peripheral IV


Central line still needed:  No


Urinary Cath still in place:  No





Assessment/Plan


Hospital Course


Right lower abdominal pain, ectopic pregnancy and right side.  Size of ectopic 


gestation more than 3.5 cm with presence of fetal echo.


Patient currently hemodynamically stable.





Discussed regarding management.  Due to the size of ectopic gestation more than 


3.5 cm and fetal echo recommended surgical management.  


Patient is not a good candidate for medical management.  She does not have any 


gynecologist as outpatient to have a follow-up, does not appear to be reliable 


to have a follow-up with serial hCG.  However I discussed about medical 


management option as well however due to size of ectopic gestation not a good 


candidate for medical management.


Procedure including laparoscopic right salpingostomy possible salpingectomy 


discussed with the patient.  Risk and benefit of procedure including risk of 


infection, bleeding, risk of anesthesia, risk of blood transfusion including but


not limited to blood borne infection including HIV, hepatitis B and C and 


transfusion reaction discussed with patient in detail


Patient verbalized understanding.


She desires to proceed with surgical management versus methotrexate treatment.


All questions were answered to patient's best satisfaction consent was signed.


Patient asked of blood transfusion in case of emergency.


She was booked for diagnostic laparoscopy, laparoscopic salpingostomy possible 


salpingectomy possible blood transfusion possible open or any other indicated 


procedure


Result Diagram:  


7/4/19 0353                                                                     


          7/3/19 1634





Results 24hrs





Laboratory Tests


                      Test
                   7/5/19
10:07


                      Beta HCG, Quantitative     22795.0








Subjective


24 Hr Interval Summary


Free Text/Dictation


07/05/2019





Denies any complaint.  Ambulating.  Urinated.  Passed flatus and had bowel 


movement.  Denies any fever or chills.  Denies any nausea vomiting.  Tolerated 


diet very well.  Had minimal scant vaginal bleeding.


Constitutional:  no complaints; 


   No improved, No chills, No diaphoresis, No disoriented, No febrile, No poor 


po, No requiring IVF, No requiring O2, No other


Eyes:  no complaints; 


   No pain, No discharge, No redness, No visual change, No other


ENT:  no complaints; 


   No bleeding, No pain, No congestion, No discharge, No dysphagia, No sore 


throat, No other


Respiratory:  no complaints; 


   No pain, No cough, No pleuritic pain, No shortness of breath, No sputum, No 


wheezing, No other


Cardiovascular:  no complaints; 


   No chest pain, No edema, No lightheadedness, No orthopenea, No palpitations, 


No paroxysmal nocturnal dyspnea, No other


Gastrointestinal:  no complaints; 


   No pain, No blood, No constipation, No decreased appetite, No diarrhea, No 


flatus, No nausea, No passing stool, No vomiting, No other


Genitourinary:  No no complaints, No bleeding, No dysuria, No discharge, No 


flank pain, No hematuria, No other


Skin:  No no complaints, No bruising, No erythema, No laceration, No pruritis, 


No rash, No skin lesions, No other


Neurologic:  No no complaints, No confusion, No dizziness, No focal-weakness, No


headache, No syncope, No seizure, No other


Endocrine:  No no complaints, No polyuria, No polydypsia, No dry skin, No temp 


intolerance, No other


Lymphatic:  No no complaints, No adenopathy, No tender nodes, No lymphadema, No 


other


Psychological:  No no complaints, No nl mood/affect, No anxiety, No confusion, 


No depression, No suicidal, No other


Immunologic:  No no complaints, No immunodeficiency, No pruritis, No rhinitis, 


No urticaria, No other





Exam/Review of Systems


Exam


Vitals





Vital Signs


  Date      Temp  Pulse  Resp  B/P (MAP)   Pulse Ox  O2          O2 Flow    FiO2


Time                                                 Delivery    Rate


    7/5/19  98.8     80    18      103/57        99


     13:48                           (72)


    7/4/19                                           Room Air


     19:53








Intake and Output





7/4/19 7/4/19 7/5/19





1515:00


23:00


07:00





IntakeIntake Total


1000 ml


2475 ml


1850 ml





OutputOutput Total


800 ml


1290 ml


1500 ml





BalanceBalance


200 ml


1185 ml


350 ml











Constitutional:  alert, oriented, well developed


Psych:  no complaints, nl mood/affect


Head:  normocephalic, atraumatic


Eyes:  nl conjunctiva, EOMI


ENMT:  nl external ears & nose, nl lips & teeth


Respiratory:  clear to auscultation, normal air movement


Cardiovascular:  regular rate and rhythm, nl pulses


Gastrointestinal:  soft, nl liver, spleen, other (Minimal appropriate tenderness


in the lap scopic incision.  No rebound tenderness, no guarding, no rigidity, no


drainage from the incisions, no evidence of erythema or hematoma around the 


incisions.)


Musculoskeletal:  nl extremities to inspection, nl gait and stance


Neurological:  CNS II-XII intact, nl mental status, nl speech, nl strength


Skin:  nl turgor, rash or lesions


Additional Comments





Laboratory Tests


                    Test
                      7/5/19
10:07


                    Beta HCG, Quantitative  48551.0 mIU/ml





Current Medications


 Medications
   Dose
          Sig/Axel
       Start Time
   Status  Last


 (Trade)       Ordered        Route
 PRN     Stop Time              Admin
Dose


                              Reason                                Admin


 Ondansetron    4 mg           BRIDGE ORDER   7/3/19        DC       



HCl
  (Zofran                 PRN
 IV
       18:00
 7/4/19


Inj)                          NAUSEA/VOMITI  17:59


                              NG


                650 mg         ER BRIDGE      7/3/19        DC       



Acetaminophen                 PRN
 PO
       18:00
 7/4/19



  (Tylenol                   .MILD PAIN     17:59


Tab)                          1-3 OR TEMP


                1,000 ml @ 
   Q8H
 IV
       7/3/19        DC       



Dextrose/Sodi  125 mls/hr                    23:00
 7/3/19


um
 Chloride                                 23:16


                1,000 ml @ 
   Q8H
 IV
       7/3/19        DC            7/3/19


Dextrose/Lact  125 mls/hr                    23:30
 7/4/19             23:30
125


ated
                                        04:15                        MLS/HR


Ringer's


 Lactated       1,000 ml @ 
   Q8H
 IV
       7/4/19        DC            7/4/19


Ringer's       125 mls/hr                    03:05
 7/4/19             19:37
125


                                             19:50                        MLS/HR


 Cefazolin      50 ml @ 
      PRE-OP ONCE
   7/4/19        DC       



Sodium/
       100 mls/hr     IVPB
          03:30
 7/4/19


Dextrose                                     03:59


 Propofol       100 ml @ ud    STK-MED        7/4/19        DC       



                              ONCE
 .ROUTE
  16:43
 7/4/19


                                             16:44


 Lidocaine
     100 mg         STK-MED        7/4/19        DC       



(Xylocaine                    ONCE
 .ROUTE
  16:43
 7/4/19


2%
  (Sdv))                                  16:44


 Rocuronium     50 mg          STK-MED        7/4/19        DC       



Bromide
                      ONCE
 .ROUTE
  16:43
 7/4/19


(Zemuron)                                    16:44


 Fentanyl       5 ml @ ud      STK-MED        7/4/19        DC       



                              ONCE
 .ROUTE
  16:44
 7/4/19


                                             16:45


                20 mg          STK-MED        7/4/19        DC       



Dexamethasone                 ONCE
 .ROUTE
  16:57
 7/4/19



  (Decadron)                                16:58


 Ondansetron    4 mg           STK-MED        7/4/19        DC       



HCl
  (Zofran                 ONCE
 .ROUTE
  16:57
 7/4/19


Inj)                                         16:58


 Cefazolin      1 gm           STK-MED        7/4/19        DC       



Sodium
                       ONCE
 .ROUTE
  16:59
 7/4/19


(Ancef)                                      17:00


 Bupivacaine    30 ml          STK-MED        7/4/19        DC            7/4/19


HCl/
                         ONCE
 .ROUTE
  17:17
 7/4/19           17:40
30 ML


Epinephrine                                  17:18


Bitart



(Marcaine


0.25%/
 Epi


(Sdv) 30 ml)


                1 ea           STK-MED        7/4/19        DC            7/4/19


Microfibrille                 ONCE
 ZFS
     18:24
 7/4/19            18:24
1 EA


r
 Collagen                                  18:25


Hemostat



(Surgiflo/



Thrombin


Combo)


                0.2 mg         PACU PRN
      7/4/19        DC       



Hydromorphone                 IV
 MILD PAIN  19:00
 7/4/19


HCl
                          1-3            19:50


(Dilaudid)


                0.4 mg         PACU PRN
      7/4/19        DC       



Hydromorphone                 IV
 MOD PAIN   19:00
 7/4/19


HCl
                          4-6            19:50


(Dilaudid)


                0.6 mg         PACU PRN
      7/4/19        DC       



Hydromorphone                 IV
 SEVERE     19:00
 7/4/19


HCl
                          PAIN 7-10      19:50


(Dilaudid)


 Fentanyl
      25 mcg         PACU ORDER     7/4/19        DC       



(Sublimaze)                   PRN
 IV
 MILD  19:00
 7/4/19


                              PAIN 1-3       19:50


 Fentanyl
      50 mcg         PACU ORDER     7/4/19        DC            7/4/19


(Sublimaze)                   PRN
 IV
 MOD   19:00
 7/4/19          19:40
50 MCG


                              PAIN 4-6       19:50


 Fentanyl
      75 mcg         PACU ORDER     7/4/19        DC       



(Sublimaze)                   PRN
 IV
       19:00
 7/4/19


                              SEVERE PAIN    19:50


                              7-10


 Ketorolac
     30 mg          PACU ORDER     7/4/19        DC       



Tromethamine
                 PRN
 IV
 FOR   19:00
 7/4/19


 (Toradol)                    PAIN AFTER IV  19:50


                              NARCOTIC MED


 Ondansetron    4 mg           PACU ORDER     7/4/19        DC       



HCl
  (Zofran                 PRN
 IV
       19:00
 7/4/19


Inj)                          NAUSEA/VOMITI  19:50


                              NG


                10 mg          PACU ORDER     7/4/19        DC       



Metoclopramid                 PRN
 IV
       19:00
 7/4/19


e HCl
                        NAUSEA/VOMITI  19:50


(Reglan)                      NG


 Labetalol      5 mg           PACU ORDER     7/4/19        DC       



HCl
                          PRN
 IV
 HIGH  19:00
 7/4/19


(Labetalol)                   BLOOD          19:50


                              PRESSURE


 Hydralazine    5 mg           PACU ORDER     7/4/19        DC       



HCl
                          PRN
 IV
 HIGH  19:00
 7/4/19


(Apresoline)                  BLOOD          19:50


                              PRESSURE


 Ephedrine      5 mg           PACU ORDER     7/4/19        DC       



Sulfate                       PRN
 IV
       19:00
 7/4/19


                              BLOOD          19:50


                              PRESSURE


                              SUPPORT


 Albuterol
     2.5 mg         PACU ORDER     7/4/19        DC       



(Proventil
                   PRN
 HHN
      19:00
 7/4/19


0.083% (Neb))                 .WHEEZING      19:50


 Meperidine     25 mg          PACU ORDER     7/4/19        DC       



HCl
                          PRN
 IV
       19:00
 7/4/19


(Demerol)                     .RIGORS        19:50


                25 mg          PACU ORDER     7/4/19        DC       



Diphenhydrami                 PRN
 IV
       19:00
 7/4/19


ne
 HCl
                      .PRURITUS      19:50


(Benadryl)


 Midazolam      0.5 mg         PACU ORDER     7/4/19        DC       



HCl
                          PRN
 IV
       19:00
 7/4/19


(Versed)                      .ANXIETY       19:50


 Fentanyl
      100 mcg        STK-MED        7/4/19        DC       



(Sublimaze)                   ONCE
 .ROUTE
  19:03
 7/4/19


                                             19:04


 Lactated       1,000 ml @ 
   Q10H
 IV
      7/4/19 7/5/19


Ringer's       100 mls/hr                    19:42
                    07:06
100


                                                                          MLS/HR


 Ibuprofen
     600 mg         Q8H  PRN
      7/4/19 7/5/19


(Motrin)                      PO
 MILD       20:00
                 16:33
600 MG


                              PAIN(1-3)OR


                              ELEVATED TEMP


                1 tab          Q4H  PRN
      7/4/19 7/5/19


Acetaminophen                 PO
 PAIN       20:00
                  11:34
1 TAB


/
                            LEVEL 6-10


Hydrocodone


Bitart



([place]


(5/325))





Laboratory Tests


                      Test
                   7/5/19
10:07


                      Beta HCG, Quantitative     32787.0








Results


Results 24hrs





Laboratory Tests


                      Test
                   7/5/19
10:07


                      Beta HCG, Quantitative     40465.0








Medications


Medication





Current Medications


Lactated Ringer's 1,000 ml @  100 mls/hr Q10H IV  Last administered on 7/5/19at 


07:06; Admin Dose 100 MLS/HR;  Start 7/4/19 at 19:42


Ibuprofen (Motrin) 600 mg Q8H  PRN PO MILD PAIN(1-3)OR ELEVATED TEMP Last 


administered on 7/5/19at 16:33; Admin Dose 600 MG;  Start 7/4/19 at 20:00


Acetaminophen/ Hydrocodone Bitart ([place]co (5/325)) 1 tab Q4H  PRN PO PAIN LEVEL 


6-10 Last administered on 7/5/19at 11:34; Admin Dose 1 TAB;  Start 7/4/19 at 


20:00











BALJIT CALLAHAN MD               Jul 5, 2019 17:19

## 2019-07-05 NOTE — PD.PPDC
OB/GYN Discharge Instruction


Condition


                 [id number]
Patient Condition:  [id number]
Good








Diet


                [id number]
Diet:  [id number]
Resume Regular Diet








Activity/Restrictions


              [id number]
Restrictions:  [id number]
Minimize Walking


                                        Minimize Stair-climbing


                                        No Sexual Activity


                                        Nothing in the Vagina


                                        No [place]


                                        No Tampons, douche








Follow-up


Follow-up with Physician:  1, Week/Weeks


Provider Information:


Follow up with Una Khalil in 7 -10 days


Dr Sebastián Almanza MD





Return to clinic for


      [id number]
GYN Instructions:       [id number]
Fever greater than 101


                                         Chills


                                         Worsening abdominal pain


                                         Excessive Vaginal Bleeding


                                         More than 2 pads per hour


                                         Unable to tolerate diet


      [id number]
Surgical Instructions:  [id number]
Incisional Drainage


                                         Incisional Redness














BALJIT CALLAHAN MD               Jul 5, 2019 17:15